# Patient Record
Sex: FEMALE | Race: WHITE | NOT HISPANIC OR LATINO | Employment: FULL TIME | URBAN - METROPOLITAN AREA
[De-identification: names, ages, dates, MRNs, and addresses within clinical notes are randomized per-mention and may not be internally consistent; named-entity substitution may affect disease eponyms.]

---
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
## 2021-08-02 ENCOUNTER — OFFICE VISIT (OUTPATIENT)
Dept: URGENT CARE | Facility: CLINIC | Age: 21
End: 2021-08-02
Payer: COMMERCIAL

## 2021-08-02 ENCOUNTER — HOSPITAL ENCOUNTER (OUTPATIENT)
Facility: MEDICAL CENTER | Age: 21
End: 2021-08-02
Attending: PHYSICIAN ASSISTANT
Payer: COMMERCIAL

## 2021-08-02 VITALS
DIASTOLIC BLOOD PRESSURE: 82 MMHG | HEART RATE: 81 BPM | OXYGEN SATURATION: 95 % | TEMPERATURE: 97.6 F | WEIGHT: 228.7 LBS | HEIGHT: 67 IN | SYSTOLIC BLOOD PRESSURE: 126 MMHG | BODY MASS INDEX: 35.9 KG/M2 | RESPIRATION RATE: 16 BRPM

## 2021-08-02 DIAGNOSIS — R19.5 LOOSE STOOLS: ICD-10-CM

## 2021-08-02 DIAGNOSIS — J02.9 SORE THROAT: ICD-10-CM

## 2021-08-02 DIAGNOSIS — R51.9 NONINTRACTABLE HEADACHE, UNSPECIFIED CHRONICITY PATTERN, UNSPECIFIED HEADACHE TYPE: ICD-10-CM

## 2021-08-02 PROBLEM — G43.909 MIGRAINE: Status: ACTIVE | Noted: 2020-10-22

## 2021-08-02 PROBLEM — D69.6 THROMBOCYTOPENIA (HCC): Status: ACTIVE | Noted: 2020-10-22

## 2021-08-02 PROCEDURE — 99203 OFFICE O/P NEW LOW 30 MIN: CPT | Performed by: PHYSICIAN ASSISTANT

## 2021-08-02 PROCEDURE — U0005 INFEC AGEN DETEC AMPLI PROBE: HCPCS

## 2021-08-02 PROCEDURE — U0003 INFECTIOUS AGENT DETECTION BY NUCLEIC ACID (DNA OR RNA); SEVERE ACUTE RESPIRATORY SYNDROME CORONAVIRUS 2 (SARS-COV-2) (CORONAVIRUS DISEASE [COVID-19]), AMPLIFIED PROBE TECHNIQUE, MAKING USE OF HIGH THROUGHPUT TECHNOLOGIES AS DESCRIBED BY CMS-2020-01-R: HCPCS

## 2021-08-02 ASSESSMENT — ENCOUNTER SYMPTOMS
MYALGIAS: 1
DIARRHEA: 0
WHEEZING: 0
SHORTNESS OF BREATH: 0
COUGH: 0
CHANGE IN BOWEL HABIT: 1
NAUSEA: 0
DIZZINESS: 0
SPUTUM PRODUCTION: 0
SORE THROAT: 1
HEADACHES: 1
FATIGUE: 1
CHILLS: 0
EYE REDNESS: 0
EYE DISCHARGE: 0
FEVER: 0
NECK PAIN: 0

## 2021-08-02 NOTE — PROGRESS NOTES
Subjective:      Norris Jefferson is a 21 y.o. female who presents with Coronavirus Screening (diarrhea, chills, sore throat, runny nose, headache started this morning)            Patient is a 21-year-old female who presents to urgent care with her significant other who also is being evaluated for similar symptoms today.  Patient awoke this morning with loose stools, body aches, scratchy throat, headache of which have waxed and waned throughout the morning.  Patient's partner is currently being tested for COVID-19 prompting patient have evaluation today as well as she is several fairly good bruise with close contact.  Patient has had a history of COVID-19 and is fully vaccinated.  No other ill exposures aside from family members.  Denies any cough, difficulty breathing, shortness of breath.  She did take some Tylenol to assist with headache of which she does report mild improvement    Other  This is a new problem. The current episode started today. The problem occurs constantly. The problem has been unchanged. Associated symptoms include a change in bowel habit, congestion, fatigue, headaches, myalgias and a sore throat. Pertinent negatives include no chest pain, chills, coughing, fever, nausea, neck pain or rash. Nothing aggravates the symptoms. She has tried acetaminophen for the symptoms. The treatment provided moderate relief.       Review of Systems   Constitutional: Positive for fatigue and malaise/fatigue. Negative for chills and fever.   HENT: Positive for congestion and sore throat. Negative for ear discharge and ear pain.    Eyes: Negative for discharge and redness.   Respiratory: Negative for cough, sputum production, shortness of breath and wheezing.    Cardiovascular: Negative for chest pain and leg swelling.   Gastrointestinal: Positive for change in bowel habit. Negative for diarrhea and nausea.   Genitourinary: Negative for dysuria and urgency.   Musculoskeletal: Positive for myalgias. Negative for  "neck pain.   Skin: Negative for itching and rash.   Neurological: Positive for headaches. Negative for dizziness.   All other systems reviewed and are negative.         Objective:     /82 (BP Location: Right arm, Patient Position: Sitting)   Pulse 81   Temp 36.4 °C (97.6 °F) (Temporal)   Resp 16   Ht 1.702 m (5' 7\")   Wt 104 kg (228 lb 11.2 oz)   SpO2 95%   BMI 35.82 kg/m²    PMH:  has a past medical history of Migraines.  MEDS: Reviewed .   ALLERGIES:   Allergies   Allergen Reactions   • Nkda [No Known Drug Allergy]      SURGHX: No past surgical history on file.  SOCHX:  reports that she has been smoking. She uses smokeless tobacco. She reports that she does not drink alcohol and does not use drugs.  FH: Family history was reviewed, no pertinent findings to report    Physical Exam  Vitals reviewed.   Constitutional:       General: She is not in acute distress.     Appearance: She is well-developed.   HENT:      Head: Normocephalic and atraumatic.      Right Ear: External ear normal.      Left Ear: External ear normal.      Mouth/Throat:      Pharynx: No oropharyngeal exudate.   Eyes:      Conjunctiva/sclera: Conjunctivae normal.      Pupils: Pupils are equal, round, and reactive to light.   Neck:      Trachea: No tracheal deviation.   Cardiovascular:      Rate and Rhythm: Normal rate and regular rhythm.      Heart sounds: No murmur heard.     Pulmonary:      Effort: Pulmonary effort is normal. No respiratory distress.      Breath sounds: Normal breath sounds.   Musculoskeletal:         General: Normal range of motion.      Cervical back: Normal range of motion and neck supple.   Skin:     General: Skin is warm.      Findings: No rash.   Neurological:      Mental Status: She is alert and oriented to person, place, and time.      Coordination: Coordination normal.   Psychiatric:         Behavior: Behavior normal.         Thought Content: Thought content normal.         Judgment: Judgment normal.          "               Assessment/Plan:        1. Nonintractable headache, unspecified chronicity pattern, unspecified headache type  2. Loose stools    Appropriate PPE worn at all times by provider.   Pt. Had face mask on throughout entirety of the visit other than oropharyngeal examination today.     Testing performed for COVID-19.    Patient currently without indication of need for higher level of care.    Results will also be released to patient/guardian in MyChart or called to the patient/guardian directly.  Encouraged mask use, frequent handwashing, wiping down hard surfaces, etc.    Patient and/or guardian given precautionary s/sx that mandate immediate follow up and evaluation in the ED. Advised of risks of not doing so.  Side effects of OTC or prescribed medications discussed.   DDX, Supportive care, and indications for immediate follow-up discussed with patient.    Instructed to return to clinic or nearest emergency department if we are not available for any change in condition, further concerns, or worsening of symptoms.    The patient and/or guardian demonstrated a good understanding and agreed with the treatment plan.    Please note that this dictation was created using voice recognition software. I have made every reasonable attempt to correct obvious errors, but I expect that there are errors of grammar and possibly content that I did not discover before finalizing the note.

## 2021-08-03 LAB
COVID ORDER STATUS COVID19: NORMAL
SARS-COV-2 RNA RESP QL NAA+PROBE: NOTDETECTED
SPECIMEN SOURCE: NORMAL

## 2022-04-11 ENCOUNTER — OFFICE VISIT (OUTPATIENT)
Dept: URGENT CARE | Facility: CLINIC | Age: 22
End: 2022-04-11
Payer: COMMERCIAL

## 2022-04-11 VITALS
RESPIRATION RATE: 16 BRPM | TEMPERATURE: 97.2 F | SYSTOLIC BLOOD PRESSURE: 114 MMHG | WEIGHT: 234 LBS | BODY MASS INDEX: 36.73 KG/M2 | HEIGHT: 67 IN | HEART RATE: 86 BPM | DIASTOLIC BLOOD PRESSURE: 72 MMHG | OXYGEN SATURATION: 98 %

## 2022-04-11 DIAGNOSIS — H10.33 ACUTE CONJUNCTIVITIS OF BOTH EYES, UNSPECIFIED ACUTE CONJUNCTIVITIS TYPE: ICD-10-CM

## 2022-04-11 PROCEDURE — 99213 OFFICE O/P EST LOW 20 MIN: CPT | Performed by: FAMILY MEDICINE

## 2022-04-11 RX ORDER — POLYMYXIN B SULFATE AND TRIMETHOPRIM 1; 10000 MG/ML; [USP'U]/ML
1 SOLUTION OPHTHALMIC 4 TIMES DAILY
Qty: 10 ML | Refills: 0 | Status: SHIPPED | OUTPATIENT
Start: 2022-04-11 | End: 2022-04-16

## 2022-04-11 ASSESSMENT — ENCOUNTER SYMPTOMS
MYALGIAS: 0
NAUSEA: 0
VOMITING: 0
PHOTOPHOBIA: 0
WEIGHT LOSS: 0
DOUBLE VISION: 0
BLURRED VISION: 0

## 2022-04-11 NOTE — LETTER
April 11, 2022         Patient: Norris Jefferson   YOB: 2000   Date of Visit: 4/11/2022           To Whom it May Concern:    Norris Jefferson was seen in my clinic on 4/11/2022. Please excuse from work today.      Sincerely,           Neville Olivares M.D.  Electronically Signed

## 2022-04-11 NOTE — PROGRESS NOTES
"Subjective     Norris Jefferson is a 21 y.o. female who presents with Conjunctivitis (Bilateral; guppy ; itchy x last night )            Onset night bilateral eye redness, irritation, and drainage.  Mattering this morning.  No contact lens use.  No obvious viral or allergic prodrome.  She does have itching.  No trauma or foreign body.  No over-the-counter medications.  No known exposures.  No other aggravating or alleviating factors.      Review of Systems   Constitutional: Negative for malaise/fatigue and weight loss.   Eyes: Negative for blurred vision, double vision and photophobia.   Gastrointestinal: Negative for nausea and vomiting.   Musculoskeletal: Negative for joint pain and myalgias.   Skin: Negative for itching and rash.              Objective     /72   Pulse 86   Temp 36.2 °C (97.2 °F) (Temporal)   Resp 16   Ht 1.702 m (5' 7\")   Wt 106 kg (234 lb)   SpO2 98%   BMI 36.65 kg/m²      Physical Exam  Constitutional:       Appearance: Normal appearance.   HENT:      Right Ear: Tympanic membrane normal.      Left Ear: Tympanic membrane normal.      Nose: Nose normal. No congestion.      Mouth/Throat:      Mouth: Mucous membranes are moist.   Eyes:      Extraocular Movements: Extraocular movements intact.      Pupils: Pupils are equal, round, and reactive to light.      Comments: B conjunctiva injected with moderate exudate. No foreign body. No gross corneal lesion.     Skin:     General: Skin is warm and dry.   Neurological:      Mental Status: She is alert.                             Assessment & Plan        1. Acute conjunctivitis of both eyes, unspecified acute conjunctivitis type    - polymixin-trimethoprim (POLYTRIM) 82787-0.1 UNIT/ML-% Solution; Administer 1 Drop into both eyes 4 times a day for 5 days.  Dispense: 10 mL; Refill: 0    Differential diagnosis, natural history, supportive care, and indications for immediate follow-up discussed at length.                 "

## 2024-05-05 ENCOUNTER — OFFICE VISIT (OUTPATIENT)
Dept: URGENT CARE | Facility: CLINIC | Age: 24
End: 2024-05-05
Payer: COMMERCIAL

## 2024-05-05 VITALS
HEIGHT: 67 IN | OXYGEN SATURATION: 97 % | TEMPERATURE: 97.9 F | SYSTOLIC BLOOD PRESSURE: 122 MMHG | RESPIRATION RATE: 16 BRPM | BODY MASS INDEX: 39.24 KG/M2 | DIASTOLIC BLOOD PRESSURE: 78 MMHG | HEART RATE: 98 BPM | WEIGHT: 250 LBS

## 2024-05-05 DIAGNOSIS — J01.00 ACUTE MAXILLARY SINUSITIS, RECURRENCE NOT SPECIFIED: Primary | ICD-10-CM

## 2024-05-05 PROCEDURE — 3074F SYST BP LT 130 MM HG: CPT | Performed by: PHYSICIAN ASSISTANT

## 2024-05-05 PROCEDURE — 99213 OFFICE O/P EST LOW 20 MIN: CPT | Performed by: PHYSICIAN ASSISTANT

## 2024-05-05 PROCEDURE — 3078F DIAST BP <80 MM HG: CPT | Performed by: PHYSICIAN ASSISTANT

## 2024-05-05 RX ORDER — AMOXICILLIN AND CLAVULANATE POTASSIUM 875; 125 MG/1; MG/1
1 TABLET, FILM COATED ORAL 2 TIMES DAILY
Qty: 14 TABLET | Refills: 0 | Status: SHIPPED | OUTPATIENT
Start: 2024-05-05 | End: 2024-05-12

## 2024-05-05 RX ORDER — DEXTROMETHORPHAN HYDROBROMIDE AND PROMETHAZINE HYDROCHLORIDE 15; 6.25 MG/5ML; MG/5ML
5 SYRUP ORAL 4 TIMES DAILY PRN
Qty: 120 ML | Refills: 0 | Status: SHIPPED | OUTPATIENT
Start: 2024-05-05 | End: 2024-05-11

## 2024-05-05 ASSESSMENT — ENCOUNTER SYMPTOMS
SINUS PAIN: 1
SWOLLEN GLANDS: 0
HEADACHES: 1
MYALGIAS: 0
COUGH: 1
SHORTNESS OF BREATH: 0
NAUSEA: 0
SINUS PRESSURE: 1
CHILLS: 0
SORE THROAT: 0
WHEEZING: 0
VOMITING: 0
FEVER: 0
DIARRHEA: 0

## 2024-05-05 ASSESSMENT — FIBROSIS 4 INDEX: FIB4 SCORE: 0.38

## 2024-05-05 NOTE — PROGRESS NOTES
"Graeme Jefferson is a 23 y.o. female who presents with Cough (Headaches x 2 weeks , congestion x 3 days )            Sinus Problem  This is a new problem. The current episode started in the past 7 days. The problem has been gradually worsening since onset. Maximum temperature: subjective fever. The pain is moderate. Associated symptoms include congestion (green and blood-tinged mucous), coughing, headaches and sinus pressure. Pertinent negatives include no chills, ear pain, shortness of breath, sneezing, sore throat or swollen glands. Treatments tried: OTC Dayquil and NYquil. The treatment provided mild relief.       Past Medical History:   Diagnosis Date    Migraines        No past surgical history on file.    No family history on file.    Nkda [no known drug allergy]    Medications, Allergies, and current problem list reviewed today in Epic      Review of Systems   Constitutional:  Negative for chills, fever and malaise/fatigue.   HENT:  Positive for congestion (green and blood-tinged mucous), sinus pressure and sinus pain. Negative for ear pain, sneezing and sore throat.    Respiratory:  Positive for cough. Negative for shortness of breath and wheezing.    Cardiovascular:  Positive for chest pain. Negative for leg swelling.   Gastrointestinal:  Negative for diarrhea, nausea and vomiting.   Musculoskeletal:  Negative for myalgias.   Skin:  Negative for rash.   Neurological:  Positive for headaches.     All other systems reviewed and are negative.            Objective     /78   Pulse 98   Temp 36.6 °C (97.9 °F) (Temporal)   Resp 16   Ht 1.702 m (5' 7\")   Wt 113 kg (250 lb)   SpO2 97%   BMI 39.16 kg/m²      Physical Exam  Constitutional:       General: She is not in acute distress.     Appearance: She is not ill-appearing.   HENT:      Head: Normocephalic and atraumatic.      Right Ear: Tympanic membrane, ear canal and external ear normal.      Left Ear: Tympanic membrane, ear canal and " external ear normal.      Nose: Mucosal edema, congestion and rhinorrhea present.      Right Sinus: Maxillary sinus tenderness present.      Left Sinus: Maxillary sinus tenderness present.      Mouth/Throat:      Mouth: Mucous membranes are moist.      Pharynx: No posterior oropharyngeal erythema.   Eyes:      Conjunctiva/sclera: Conjunctivae normal.   Cardiovascular:      Rate and Rhythm: Normal rate and regular rhythm.      Heart sounds: Normal heart sounds. No murmur heard.  Pulmonary:      Effort: Pulmonary effort is normal. No respiratory distress.      Breath sounds: Normal breath sounds. No wheezing, rhonchi or rales.   Lymphadenopathy:      Cervical: No cervical adenopathy.   Skin:     General: Skin is warm and dry.      Findings: No rash.   Neurological:      General: No focal deficit present.      Mental Status: She is alert and oriented to person, place, and time.   Psychiatric:         Mood and Affect: Mood normal.         Behavior: Behavior normal.         Thought Content: Thought content normal.         Judgment: Judgment normal.                             Assessment & Plan        1. Acute maxillary sinusitis, recurrence not specified    - amoxicillin-clavulanate (AUGMENTIN) 875-125 MG Tab; Take 1 Tablet by mouth 2 times a day for 7 days.  Dispense: 14 Tablet; Refill: 0  - promethazine-dextromethorphan (PROMETHAZINE-DM) 6.25-15 MG/5ML syrup; Take 5 mL by mouth 4 times a day as needed for Cough for up to 6 days.  Dispense: 120 mL; Refill: 0      Differential diagnoses, Supportive care, and indications for immediate follow-up discussed with patient.   Pathogenesis of diagnosis discussed including typical length and natural progression.   Instructed to return to clinic or nearest emergency department for any change in condition, further concerns, or worsening of symptoms.    The patient demonstrated a good understanding and agreed with the treatment plan.    Chyna Ellington P.A.-C.

## 2024-06-09 ENCOUNTER — OFFICE VISIT (OUTPATIENT)
Dept: URGENT CARE | Facility: CLINIC | Age: 24
End: 2024-06-09
Payer: COMMERCIAL

## 2024-06-09 VITALS
DIASTOLIC BLOOD PRESSURE: 60 MMHG | WEIGHT: 256 LBS | SYSTOLIC BLOOD PRESSURE: 124 MMHG | BODY MASS INDEX: 40.18 KG/M2 | TEMPERATURE: 97.8 F | OXYGEN SATURATION: 97 % | HEIGHT: 67 IN | HEART RATE: 84 BPM | RESPIRATION RATE: 18 BRPM

## 2024-06-09 DIAGNOSIS — B96.89 ACUTE BACTERIAL PHARYNGITIS: ICD-10-CM

## 2024-06-09 DIAGNOSIS — J02.8 ACUTE BACTERIAL PHARYNGITIS: ICD-10-CM

## 2024-06-09 DIAGNOSIS — J02.9 SORE THROAT: ICD-10-CM

## 2024-06-09 PROCEDURE — 3074F SYST BP LT 130 MM HG: CPT | Performed by: NURSE PRACTITIONER

## 2024-06-09 PROCEDURE — 99214 OFFICE O/P EST MOD 30 MIN: CPT | Performed by: NURSE PRACTITIONER

## 2024-06-09 PROCEDURE — 3078F DIAST BP <80 MM HG: CPT | Performed by: NURSE PRACTITIONER

## 2024-06-09 RX ORDER — AMOXICILLIN 875 MG/1
875 TABLET, COATED ORAL 2 TIMES DAILY
Qty: 14 TABLET | Refills: 0 | Status: SHIPPED | OUTPATIENT
Start: 2024-06-09 | End: 2024-06-16

## 2024-06-09 ASSESSMENT — FIBROSIS 4 INDEX: FIB4 SCORE: 0.4

## 2024-06-09 NOTE — PROGRESS NOTES
"Subjective:   Norris Jefferson is a 24 y.o. female who presents for Sore Throat (X 2 -3 weeks)       HPI  Patient is a pleasant 24 y.o. who presents for evaluation of 2 to 3-week history of sore throat, pain with swallowing, and decreased appetite secondary to sore throat.  Has tried over-the-counter cough and cold medication and cough drops without noticing any relief.    ROS  All other systems are negative except as documented above within HPI.    MEDS: No current outpatient medications on file.  ALLERGIES:   Allergies   Allergen Reactions    Nkda [No Known Drug Allergy]        Patient's PMH, SocHx, SurgHx, FamHx, Drug allergies and medications were reviewed.     Objective:   /60   Pulse 84   Temp 36.6 °C (97.8 °F) (Temporal)   Resp 18   Ht 1.702 m (5' 7\")   Wt 116 kg (256 lb)   SpO2 97%   BMI 40.10 kg/m²     Physical Exam  Vitals and nursing note reviewed.   Constitutional:       General: She is awake.      Appearance: Normal appearance. She is well-developed and normal weight.   HENT:      Head: Normocephalic and atraumatic.      Right Ear: Tympanic membrane, ear canal and external ear normal.      Left Ear: Tympanic membrane, ear canal and external ear normal.      Nose: No nasal tenderness, mucosal edema, congestion or rhinorrhea.      Right Sinus: No frontal sinus tenderness.      Left Sinus: No frontal sinus tenderness.      Mouth/Throat:      Lips: Pink.      Mouth: Mucous membranes are moist.      Pharynx: Oropharynx is clear. Uvula midline. Posterior oropharyngeal erythema present.      Tonsils: 3+ on the right. 3+ on the left.   Eyes:      Extraocular Movements: Extraocular movements intact.      Conjunctiva/sclera: Conjunctivae normal.   Cardiovascular:      Rate and Rhythm: Normal rate and regular rhythm.      Pulses: Normal pulses.      Heart sounds: Normal heart sounds.   Pulmonary:      Effort: Pulmonary effort is normal.      Breath sounds: Normal breath sounds.   Musculoskeletal:    "      General: Normal range of motion.      Cervical back: Normal range of motion and neck supple.   Lymphadenopathy:      Head:      Right side of head: Tonsillar adenopathy present.      Left side of head: Tonsillar adenopathy present.   Skin:     General: Skin is warm and dry.   Neurological:      General: No focal deficit present.      Mental Status: She is alert and oriented to person, place, and time.   Psychiatric:         Mood and Affect: Mood normal.         Behavior: Behavior normal. Behavior is cooperative.         Thought Content: Thought content normal.         Judgment: Judgment normal.         Assessment/Plan:   Assessment    1. Acute bacterial pharyngitis  - amoxicillin (AMOXIL) 875 MG tablet; Take 1 Tablet by mouth 2 times a day for 7 days.  Dispense: 14 Tablet; Refill: 0    2. Sore throat      Vital signs stable at today's acute urgent care visit.  Begin medications as listed. Recommend Listerine/ salt water gargle.    Advised the patient to follow-up with the primary care provider if symptoms persist.  Red flags discussed and indications to immediately call 911 or present to the ED. All questions were encouraged and answered to the patient's satisfaction and understanding, and they agree to the plan of care.     This is an acute problem with uncertain prognosis, medication management and instructions as well as management options were provided.  I personally reviewed prior external notes and test results pertinent to today and independently reviewed and interpreted all diagnostics, to include POCT testing. Time spent evaluating this patient includes preparing for visit, counseling/education, exam, evaluation, obtaining history, and ordering lab/test/procedures.      Please note that this dictation was created using voice recognition software. I have made a reasonable attempt to correct obvious errors, but I expect that there are errors of grammar and possibly content that I did not discover before  finalizing the note.

## 2024-06-20 ENCOUNTER — OFFICE VISIT (OUTPATIENT)
Dept: MEDICAL GROUP | Facility: PHYSICIAN GROUP | Age: 24
End: 2024-06-20
Payer: COMMERCIAL

## 2024-06-20 VITALS
DIASTOLIC BLOOD PRESSURE: 78 MMHG | OXYGEN SATURATION: 98 % | SYSTOLIC BLOOD PRESSURE: 118 MMHG | BODY MASS INDEX: 39.71 KG/M2 | WEIGHT: 253 LBS | TEMPERATURE: 97.3 F | RESPIRATION RATE: 12 BRPM | HEART RATE: 97 BPM | HEIGHT: 67 IN

## 2024-06-20 DIAGNOSIS — Z11.3 SCREEN FOR STD (SEXUALLY TRANSMITTED DISEASE): ICD-10-CM

## 2024-06-20 DIAGNOSIS — G56.03 BILATERAL CARPAL TUNNEL SYNDROME: ICD-10-CM

## 2024-06-20 DIAGNOSIS — Z11.59 ENCOUNTER FOR HEPATITIS C SCREENING TEST FOR LOW RISK PATIENT: ICD-10-CM

## 2024-06-20 DIAGNOSIS — Z00.00 PHYSICAL EXAM, ANNUAL: ICD-10-CM

## 2024-06-20 DIAGNOSIS — Z72.0 CURRENT EVERY DAY NICOTINE VAPING: ICD-10-CM

## 2024-06-20 DIAGNOSIS — G43.109 MIGRAINE WITH AURA AND WITHOUT STATUS MIGRAINOSUS, NOT INTRACTABLE: ICD-10-CM

## 2024-06-20 PROCEDURE — 99213 OFFICE O/P EST LOW 20 MIN: CPT | Performed by: PHYSICIAN ASSISTANT

## 2024-06-20 PROCEDURE — 3078F DIAST BP <80 MM HG: CPT | Performed by: PHYSICIAN ASSISTANT

## 2024-06-20 PROCEDURE — 3074F SYST BP LT 130 MM HG: CPT | Performed by: PHYSICIAN ASSISTANT

## 2024-06-20 SDOH — ECONOMIC STABILITY: HOUSING INSECURITY: IN THE LAST 12 MONTHS, HOW MANY PLACES HAVE YOU LIVED?: 3

## 2024-06-20 SDOH — ECONOMIC STABILITY: FOOD INSECURITY: WITHIN THE PAST 12 MONTHS, YOU WORRIED THAT YOUR FOOD WOULD RUN OUT BEFORE YOU GOT MONEY TO BUY MORE.: NEVER TRUE

## 2024-06-20 SDOH — ECONOMIC STABILITY: FOOD INSECURITY: WITHIN THE PAST 12 MONTHS, THE FOOD YOU BOUGHT JUST DIDN'T LAST AND YOU DIDN'T HAVE MONEY TO GET MORE.: NEVER TRUE

## 2024-06-20 SDOH — HEALTH STABILITY: PHYSICAL HEALTH: ON AVERAGE, HOW MANY DAYS PER WEEK DO YOU ENGAGE IN MODERATE TO STRENUOUS EXERCISE (LIKE A BRISK WALK)?: 3 DAYS

## 2024-06-20 SDOH — ECONOMIC STABILITY: HOUSING INSECURITY
IN THE LAST 12 MONTHS, WAS THERE A TIME WHEN YOU DID NOT HAVE A STEADY PLACE TO SLEEP OR SLEPT IN A SHELTER (INCLUDING NOW)?: NO

## 2024-06-20 SDOH — HEALTH STABILITY: MENTAL HEALTH
STRESS IS WHEN SOMEONE FEELS TENSE, NERVOUS, ANXIOUS, OR CAN'T SLEEP AT NIGHT BECAUSE THEIR MIND IS TROUBLED. HOW STRESSED ARE YOU?: RATHER MUCH

## 2024-06-20 SDOH — ECONOMIC STABILITY: INCOME INSECURITY: HOW HARD IS IT FOR YOU TO PAY FOR THE VERY BASICS LIKE FOOD, HOUSING, MEDICAL CARE, AND HEATING?: NOT VERY HARD

## 2024-06-20 SDOH — ECONOMIC STABILITY: TRANSPORTATION INSECURITY
IN THE PAST 12 MONTHS, HAS THE LACK OF TRANSPORTATION KEPT YOU FROM MEDICAL APPOINTMENTS OR FROM GETTING MEDICATIONS?: NO

## 2024-06-20 SDOH — ECONOMIC STABILITY: INCOME INSECURITY: IN THE LAST 12 MONTHS, WAS THERE A TIME WHEN YOU WERE NOT ABLE TO PAY THE MORTGAGE OR RENT ON TIME?: NO

## 2024-06-20 SDOH — ECONOMIC STABILITY: TRANSPORTATION INSECURITY
IN THE PAST 12 MONTHS, HAS LACK OF RELIABLE TRANSPORTATION KEPT YOU FROM MEDICAL APPOINTMENTS, MEETINGS, WORK OR FROM GETTING THINGS NEEDED FOR DAILY LIVING?: NO

## 2024-06-20 SDOH — ECONOMIC STABILITY: TRANSPORTATION INSECURITY
IN THE PAST 12 MONTHS, HAS LACK OF TRANSPORTATION KEPT YOU FROM MEETINGS, WORK, OR FROM GETTING THINGS NEEDED FOR DAILY LIVING?: NO

## 2024-06-20 SDOH — HEALTH STABILITY: PHYSICAL HEALTH: ON AVERAGE, HOW MANY MINUTES DO YOU ENGAGE IN EXERCISE AT THIS LEVEL?: 20 MIN

## 2024-06-20 ASSESSMENT — SOCIAL DETERMINANTS OF HEALTH (SDOH)
DO YOU BELONG TO ANY CLUBS OR ORGANIZATIONS SUCH AS CHURCH GROUPS UNIONS, FRATERNAL OR ATHLETIC GROUPS, OR SCHOOL GROUPS?: NO
HOW OFTEN DO YOU HAVE SIX OR MORE DRINKS ON ONE OCCASION: LESS THAN MONTHLY
WITHIN THE PAST 12 MONTHS, YOU WORRIED THAT YOUR FOOD WOULD RUN OUT BEFORE YOU GOT THE MONEY TO BUY MORE: NEVER TRUE
HOW OFTEN DO YOU ATTENT MEETINGS OF THE CLUB OR ORGANIZATION YOU BELONG TO?: PATIENT DECLINED
IN A TYPICAL WEEK, HOW MANY TIMES DO YOU TALK ON THE PHONE WITH FAMILY, FRIENDS, OR NEIGHBORS?: THREE TIMES A WEEK
HOW OFTEN DO YOU GET TOGETHER WITH FRIENDS OR RELATIVES?: NEVER
HOW OFTEN DO YOU GET TOGETHER WITH FRIENDS OR RELATIVES?: NEVER
IN THE PAST 12 MONTHS, HAS THE ELECTRIC, GAS, OIL, OR WATER COMPANY THREATENED TO SHUT OFF SERVICE IN YOUR HOME?: NO
ARE YOU MARRIED, WIDOWED, DIVORCED, SEPARATED, NEVER MARRIED, OR LIVING WITH A PARTNER?: LIVING WITH PARTNER
HOW OFTEN DO YOU HAVE A DRINK CONTAINING ALCOHOL: 2-4 TIMES A MONTH
DO YOU BELONG TO ANY CLUBS OR ORGANIZATIONS SUCH AS CHURCH GROUPS UNIONS, FRATERNAL OR ATHLETIC GROUPS, OR SCHOOL GROUPS?: NO
HOW OFTEN DO YOU ATTEND CHURCH OR RELIGIOUS SERVICES?: NEVER
IN A TYPICAL WEEK, HOW MANY TIMES DO YOU TALK ON THE PHONE WITH FAMILY, FRIENDS, OR NEIGHBORS?: THREE TIMES A WEEK
ARE YOU MARRIED, WIDOWED, DIVORCED, SEPARATED, NEVER MARRIED, OR LIVING WITH A PARTNER?: LIVING WITH PARTNER
HOW OFTEN DO YOU ATTEND CHURCH OR RELIGIOUS SERVICES?: NEVER
HOW MANY DRINKS CONTAINING ALCOHOL DO YOU HAVE ON A TYPICAL DAY WHEN YOU ARE DRINKING: 1 OR 2
HOW OFTEN DO YOU ATTENT MEETINGS OF THE CLUB OR ORGANIZATION YOU BELONG TO?: PATIENT DECLINED
HOW HARD IS IT FOR YOU TO PAY FOR THE VERY BASICS LIKE FOOD, HOUSING, MEDICAL CARE, AND HEATING?: NOT VERY HARD

## 2024-06-20 ASSESSMENT — PATIENT HEALTH QUESTIONNAIRE - PHQ9
SUM OF ALL RESPONSES TO PHQ QUESTIONS 1-9: 9
CLINICAL INTERPRETATION OF PHQ2 SCORE: 2
5. POOR APPETITE OR OVEREATING: 2 - MORE THAN HALF THE DAYS

## 2024-06-20 ASSESSMENT — FIBROSIS 4 INDEX: FIB4 SCORE: 0.4

## 2024-06-20 ASSESSMENT — LIFESTYLE VARIABLES
HOW MANY STANDARD DRINKS CONTAINING ALCOHOL DO YOU HAVE ON A TYPICAL DAY: 1 OR 2
HOW OFTEN DO YOU HAVE A DRINK CONTAINING ALCOHOL: 2-4 TIMES A MONTH
SKIP TO QUESTIONS 9-10: 0
AUDIT-C TOTAL SCORE: 3
HOW OFTEN DO YOU HAVE SIX OR MORE DRINKS ON ONE OCCASION: LESS THAN MONTHLY

## 2024-06-20 NOTE — PROGRESS NOTES
CC:    Chief Complaint   Patient presents with    Establish Care       HISTORY OF THE PRESENT ILLNESS: Patient is a 24 y.o. female presenting to establish primary care     Pt having bilateral carpal tunnel pain. Has been having this since her first pregnancy in 2020. Sxs worse at night. She is currently in nursing school.   Pt gets migraines that mimick stroke sxs. Has had them since she was a child. Used to take maxalt. Just takes OTC meds now though.   Pt has not had PAP smear in years    No problem-specific Assessment & Plan notes found for this encounter.    Allergies: Nkda [no known drug allergy]    No current [facility]-ordered outpatient medications on file.     No current [facility]-ordered facility-administered medications on file.       Past Medical History:   Diagnosis Date    Migraines        No past surgical history on file.    Social History     Tobacco Use    Smoking status: Never    Smokeless tobacco: Never    Tobacco comments:     vaping   Vaping Use    Vaping status: Every Day    Substances: Nicotine    Devices: Disposable   Substance Use Topics    Alcohol use: Yes     Comment: occ    Drug use: No       Social History     Social History Narrative    Not on file       No family history on file.    ROS:     - Constitutional: Negative for fever, chills, unexpected weight change, and fatigue/generalized weakness.     - HEENT: Negative for headaches, vision changes, hearing changes, ear pain, ear discharge, rhinorrhea, sinus congestion, sore throat, and neck pain.      - Respiratory: Negative for cough, sputum production, chest congestion, dyspnea, wheezing, and crackles.      - Cardiovascular: Negative for chest pain, palpitations, orthopnea, and bilateral lower extremity edema.     - Gastrointestinal: Positive for constipation. Negative for heartburn, nausea, vomiting, abdominal pain, hematochezia, melena, diarrhea, and greasy/foul-smelling stools.     - Genitourinary: Negative for dysuria, polyuria, hematuria,  "pyuria, urinary urgency, and urinary incontinence.     - Musculoskeletal: Negative for myalgias, back pain, and joint pain.     - Skin: Negative for rash, itching, cyanotic skin color change.     - Neurological:Positive for migraines.  Negative for dizziness, tingling, tremors, focal sensory deficit, focal weakness     - Endo/Heme/Allergies: Does not bruise/bleed easily.     - Psychiatric/Behavioral: Positive for anxiety and depression. Negative for suicidal/homicidal ideation and memory loss.            .      Exam: /78   Pulse 97   Temp 36.3 °C (97.3 °F) (Temporal)   Resp 12   Ht 1.702 m (5' 7\")   Wt 115 kg (253 lb)   SpO2 98%  Body mass index is 39.63 kg/m².    General: Normal appearing. No acute distress.  Skin: Warm and dry.  No obvious lesions.  HEENT: Normocephalic. Eyes conjunctiva clear lids without ptosis, ears normal shape and contour  Cardiovascular: Regular rate and rhythm without murmur.   Respiratory: Clear to auscultation bilaterally, no rhonchi wheezing or rales.  Neurologic: Grossly nonfocal, A&O x3, gait normal,  Musculoskeletal: No deformity or swelling.   Extremities: No extremity cyanosis, clubbing, or edema.  Psych: Normal mood and affect. Judgment and insight is normal.    Please note that this dictation was created using voice recognition software. I have made every reasonable attempt to correct obvious errors, but I expect that there are errors of grammar and possibly content that I did not discover before finalizing the note.      Assessment/Plan  1. Bilateral carpal tunnel syndrome  Referral to hand surgeon placed  - Referral to Hand Surgery    2. Physical exam, annual  Labs printed. Will do PAP at next visit  - CBC WITH DIFFERENTIAL; Future  - Comp Metabolic Panel; Future  - HEMOGLOBIN A1C; Future  - Lipid Profile; Future  - TSH WITH REFLEX TO FT4; Future    3. Encounter for hepatitis C screening test for low risk patient    - HEP C VIRUS ANTIBODY; Future    4. Screen for STD " (sexually transmitted disease)    - HIV AG/AB COMBO ASSAY SCREENING; Future    5. Migraine with aura and without status migrainosus, not intractable      6. Current every day nicotine vaping  Advised to quit

## 2024-07-22 ENCOUNTER — HOSPITAL ENCOUNTER (OUTPATIENT)
Facility: MEDICAL CENTER | Age: 24
End: 2024-07-22
Attending: PHYSICIAN ASSISTANT
Payer: COMMERCIAL

## 2024-07-22 ENCOUNTER — OFFICE VISIT (OUTPATIENT)
Dept: MEDICAL GROUP | Facility: PHYSICIAN GROUP | Age: 24
End: 2024-07-22
Payer: COMMERCIAL

## 2024-07-22 VITALS
WEIGHT: 256 LBS | HEART RATE: 99 BPM | TEMPERATURE: 97 F | RESPIRATION RATE: 12 BRPM | HEIGHT: 67 IN | BODY MASS INDEX: 40.18 KG/M2 | OXYGEN SATURATION: 96 % | SYSTOLIC BLOOD PRESSURE: 110 MMHG | DIASTOLIC BLOOD PRESSURE: 80 MMHG

## 2024-07-22 DIAGNOSIS — Z01.419 WELL WOMAN EXAM: ICD-10-CM

## 2024-07-22 DIAGNOSIS — N92.6 IRREGULAR MENSES: ICD-10-CM

## 2024-07-22 DIAGNOSIS — Z12.4 CERVICAL CANCER SCREENING: ICD-10-CM

## 2024-07-22 PROCEDURE — 3074F SYST BP LT 130 MM HG: CPT | Performed by: PHYSICIAN ASSISTANT

## 2024-07-22 PROCEDURE — 99395 PREV VISIT EST AGE 18-39: CPT | Performed by: PHYSICIAN ASSISTANT

## 2024-07-22 PROCEDURE — 3079F DIAST BP 80-89 MM HG: CPT | Performed by: PHYSICIAN ASSISTANT

## 2024-07-22 PROCEDURE — 87624 HPV HI-RISK TYP POOLED RSLT: CPT

## 2024-07-22 PROCEDURE — 88175 CYTOPATH C/V AUTO FLUID REDO: CPT

## 2024-07-22 ASSESSMENT — FIBROSIS 4 INDEX: FIB4 SCORE: 0.4

## 2024-07-23 DIAGNOSIS — Z12.4 CERVICAL CANCER SCREENING: ICD-10-CM

## 2024-07-26 LAB
CYTOLOGIST CVX/VAG CYTO: NORMAL
CYTOLOGY CVX/VAG DOC CYTO: NORMAL
CYTOLOGY CVX/VAG DOC THIN PREP: NORMAL
HPV I/H RISK 4 DNA CVX QL PROBE+SIG AMP: NEGATIVE
NOTE NL11727A: NORMAL
OTHER STN SPEC: NORMAL
STAT OF ADQ CVX/VAG CYTO-IMP: NORMAL

## 2024-09-29 ENCOUNTER — HOSPITAL ENCOUNTER (EMERGENCY)
Facility: MEDICAL CENTER | Age: 24
End: 2024-09-29
Attending: EMERGENCY MEDICINE
Payer: MEDICAID

## 2024-09-29 VITALS
HEIGHT: 67 IN | OXYGEN SATURATION: 93 % | DIASTOLIC BLOOD PRESSURE: 82 MMHG | WEIGHT: 274.03 LBS | TEMPERATURE: 97.4 F | RESPIRATION RATE: 18 BRPM | BODY MASS INDEX: 43.01 KG/M2 | HEART RATE: 85 BPM | SYSTOLIC BLOOD PRESSURE: 148 MMHG

## 2024-09-29 DIAGNOSIS — K04.7 INFECTED DENTAL CARIES: ICD-10-CM

## 2024-09-29 DIAGNOSIS — R10.13 EPIGASTRIC ABDOMINAL PAIN: ICD-10-CM

## 2024-09-29 DIAGNOSIS — K08.89 PAIN, DENTAL: ICD-10-CM

## 2024-09-29 DIAGNOSIS — K02.9 INFECTED DENTAL CARIES: ICD-10-CM

## 2024-09-29 LAB
ALBUMIN SERPL BCP-MCNC: 4.2 G/DL (ref 3.2–4.9)
ALBUMIN/GLOB SERPL: 1.4 G/DL
ALP SERPL-CCNC: 78 U/L (ref 30–99)
ALT SERPL-CCNC: 43 U/L (ref 2–50)
ANION GAP SERPL CALC-SCNC: 13 MMOL/L (ref 7–16)
APAP SERPL-MCNC: 19 UG/ML (ref 10–30)
APTT PPP: 28.4 SEC (ref 24.7–36)
AST SERPL-CCNC: 29 U/L (ref 12–45)
BASOPHILS # BLD AUTO: 1 % (ref 0–1.8)
BASOPHILS # BLD: 0.07 K/UL (ref 0–0.12)
BILIRUB SERPL-MCNC: 0.2 MG/DL (ref 0.1–1.5)
BUN SERPL-MCNC: 15 MG/DL (ref 8–22)
CALCIUM ALBUM COR SERPL-MCNC: 9.1 MG/DL (ref 8.5–10.5)
CALCIUM SERPL-MCNC: 9.3 MG/DL (ref 8.4–10.2)
CHLORIDE SERPL-SCNC: 104 MMOL/L (ref 96–112)
CO2 SERPL-SCNC: 21 MMOL/L (ref 20–33)
CREAT SERPL-MCNC: 0.85 MG/DL (ref 0.5–1.4)
EOSINOPHIL # BLD AUTO: 0.23 K/UL (ref 0–0.51)
EOSINOPHIL NFR BLD: 3.3 % (ref 0–6.9)
ERYTHROCYTE [DISTWIDTH] IN BLOOD BY AUTOMATED COUNT: 42.5 FL (ref 35.9–50)
GFR SERPLBLD CREATININE-BSD FMLA CKD-EPI: 97 ML/MIN/1.73 M 2
GLOBULIN SER CALC-MCNC: 2.9 G/DL (ref 1.9–3.5)
GLUCOSE SERPL-MCNC: 109 MG/DL (ref 65–99)
HCG SERPL QL: NEGATIVE
HCT VFR BLD AUTO: 39.9 % (ref 37–47)
HGB BLD-MCNC: 13.1 G/DL (ref 12–16)
IMM GRANULOCYTES # BLD AUTO: 0.01 K/UL (ref 0–0.11)
IMM GRANULOCYTES NFR BLD AUTO: 0.1 % (ref 0–0.9)
INR PPP: 0.93 (ref 0.87–1.13)
LYMPHOCYTES # BLD AUTO: 2.7 K/UL (ref 1–4.8)
LYMPHOCYTES NFR BLD: 38.6 % (ref 22–41)
MCH RBC QN AUTO: 26.6 PG (ref 27–33)
MCHC RBC AUTO-ENTMCNC: 32.8 G/DL (ref 32.2–35.5)
MCV RBC AUTO: 80.9 FL (ref 81.4–97.8)
MONOCYTES # BLD AUTO: 0.51 K/UL (ref 0–0.85)
MONOCYTES NFR BLD AUTO: 7.3 % (ref 0–13.4)
NEUTROPHILS # BLD AUTO: 3.47 K/UL (ref 1.82–7.42)
NEUTROPHILS NFR BLD: 49.7 % (ref 44–72)
NRBC # BLD AUTO: 0 K/UL
NRBC BLD-RTO: 0 /100 WBC (ref 0–0.2)
PLATELET # BLD AUTO: 260 K/UL (ref 164–446)
PMV BLD AUTO: 10 FL (ref 9–12.9)
POTASSIUM SERPL-SCNC: 4.1 MMOL/L (ref 3.6–5.5)
PROT SERPL-MCNC: 7.1 G/DL (ref 6–8.2)
PROTHROMBIN TIME: 12.7 SEC (ref 12–14.6)
RBC # BLD AUTO: 4.93 M/UL (ref 4.2–5.4)
SALICYLATES SERPL-MCNC: <1 MG/DL (ref 15–25)
SODIUM SERPL-SCNC: 138 MMOL/L (ref 135–145)
WBC # BLD AUTO: 7 K/UL (ref 4.8–10.8)

## 2024-09-29 PROCEDURE — 96374 THER/PROPH/DIAG INJ IV PUSH: CPT

## 2024-09-29 PROCEDURE — 99285 EMERGENCY DEPT VISIT HI MDM: CPT

## 2024-09-29 PROCEDURE — 80053 COMPREHEN METABOLIC PANEL: CPT

## 2024-09-29 PROCEDURE — 80143 DRUG ASSAY ACETAMINOPHEN: CPT

## 2024-09-29 PROCEDURE — A9270 NON-COVERED ITEM OR SERVICE: HCPCS | Performed by: EMERGENCY MEDICINE

## 2024-09-29 PROCEDURE — 700111 HCHG RX REV CODE 636 W/ 250 OVERRIDE (IP): Mod: JZ | Performed by: EMERGENCY MEDICINE

## 2024-09-29 PROCEDURE — 85730 THROMBOPLASTIN TIME PARTIAL: CPT

## 2024-09-29 PROCEDURE — 36415 COLL VENOUS BLD VENIPUNCTURE: CPT

## 2024-09-29 PROCEDURE — 700102 HCHG RX REV CODE 250 W/ 637 OVERRIDE(OP): Performed by: EMERGENCY MEDICINE

## 2024-09-29 PROCEDURE — 85610 PROTHROMBIN TIME: CPT

## 2024-09-29 PROCEDURE — 80179 DRUG ASSAY SALICYLATE: CPT

## 2024-09-29 PROCEDURE — 85025 COMPLETE CBC W/AUTO DIFF WBC: CPT

## 2024-09-29 PROCEDURE — 84703 CHORIONIC GONADOTROPIN ASSAY: CPT

## 2024-09-29 RX ORDER — MORPHINE SULFATE 15 MG/1
7.5 TABLET ORAL EVERY 6 HOURS PRN
Qty: 12 TABLET | Refills: 0 | Status: SHIPPED | OUTPATIENT
Start: 2024-09-29 | End: 2024-10-05

## 2024-09-29 RX ORDER — KETOROLAC TROMETHAMINE 15 MG/ML
15 INJECTION, SOLUTION INTRAMUSCULAR; INTRAVENOUS ONCE
Status: COMPLETED | OUTPATIENT
Start: 2024-09-29 | End: 2024-09-29

## 2024-09-29 RX ADMIN — LIDOCAINE HYDROCHLORIDE 30 ML: 20 SOLUTION ORAL; TOPICAL at 22:20

## 2024-09-29 RX ADMIN — OMEPRAZOLE 20 MG: 20 CAPSULE, DELAYED RELEASE ORAL at 22:20

## 2024-09-29 RX ADMIN — AMOXICILLIN AND CLAVULANATE POTASSIUM 1 TABLET: 875; 125 TABLET, FILM COATED ORAL at 22:20

## 2024-09-29 RX ADMIN — KETOROLAC TROMETHAMINE 15 MG: 15 INJECTION, SOLUTION INTRAMUSCULAR; INTRAVENOUS at 22:19

## 2024-09-30 NOTE — DISCHARGE INSTRUCTIONS
Do not take more than 4000 mg of acetaminophen or Tylenol in 24-hour period.  No drinking or driving on morphine.  Return to the emergency department if you have increasing facial swelling, difficulty breathing, difficulty swallowing or an abscess.

## 2024-09-30 NOTE — ED NOTES
Patient was read discharge instructions. Patient has no other questions or concerns at this time. Patient is AAOX4. Breathing is even and unlabored Norris Rochaian was discharged home ambulatory, gait upright, steady. Patient tolerated Iv removal

## 2024-09-30 NOTE — ED PROVIDER NOTES
"ER Provider Note    Scribed for Dr. Sang Durán M.D. by Randall Bowen. 9/29/2024  8:45 PM    Primary Care Provider: Elroy Tristan P.A.-C.    CHIEF COMPLAINT   Chief Complaint   Patient presents with    Dental Pain     L lower teeth. Reports she has been taking tylenol/ibuprofen for same and is concerned she took too much. Reports she has been taking \"4 tylenol and 6 ibuprofen every 4-6 hours and now my stomach feels like it's on fire right by my belly button\".     Abdominal Pain     EXTERNAL RECORDS REVIEWED  Other None pertinent to visit    HPI/ROS  LIMITATION TO HISTORY   Select: : None  OUTSIDE HISTORIAN(S):  None    Norris Jefferson is a 24 y.o. female who presents to the ED complaining of dental pain onset one week ago. The patient reports that due to the pain, she was taking 4 regular strength Tylenol and 6 ibuprofen about every 6 hours. She notes a burning in her stomach and states that the medications did not help to alleviate her dental pain. Patient adds that she has taken these medications nearly every day for most of her life due to chronic headaches. Denies chance of pregnancy. No known drug allergies.    PAST MEDICAL HISTORY  Past Medical History:   Diagnosis Date    Migraines      SURGICAL HISTORY  Past Surgical History:   Procedure Laterality Date    PRIMARY C SECTION      TONSILLECTOMY      TUBAL COAGULATION LAPAROSCOPIC BILATERAL       FAMILY HISTORY  Family History   Problem Relation Age of Onset    Cancer Paternal Grandfather      SOCIAL HISTORY   reports that she has never smoked. She has never used smokeless tobacco. She reports current alcohol use. She reports that she does not use drugs.    CURRENT MEDICATIONS  Previous Medications    No medications on file     ALLERGIES  Nkda [no known drug allergy]    PHYSICAL EXAM  BP (!) 159/85   Pulse 87   Temp 36.1 °C (96.9 °F)   Resp 18   Ht 1.702 m (5' 7\")   Wt 124 kg (274 lb 0.5 oz)   LMP 09/19/2024 (Exact Date) Comment: " reports LMP only 2d long. States this is not irregular for her.  SpO2 92%   BMI 42.92 kg/m²   Constitutional: Well developed, Well nourished, Mild distress.   HENT: Normocephalic, Atraumatic, Left lower 1st and 2nd molars have dental caries to the gum. No abscess or signs of Marshal's angina.  Eyes: Conjunctiva normal, No discharge.   Cardiovascular: Normal heart rate, Normal rhythm, No murmurs, equal pulses.   Pulmonary: Normal breath sounds, No respiratory distress, No wheezing, No rales, No rhonchi.  Chest: No chest wall tenderness or deformity.   Abdomen:Soft, No masses, no rebound, no guarding. Tenderness in epigastric region  Musculoskeletal: No major deformities noted, No tenderness.   Skin: Warm, Dry, No erythema, No rash.   Neurologic: Alert & oriented x 3, Normal motor function,  No focal deficits noted.   Psychiatric: Affect normal, Judgment normal, Mood normal.     DIAGNOSTIC STUDIES    LABS  Results for orders placed or performed during the hospital encounter of 09/29/24   CBC WITH DIFFERENTIAL   Result Value Ref Range    WBC 7.0 4.8 - 10.8 K/uL    RBC 4.93 4.20 - 5.40 M/uL    Hemoglobin 13.1 12.0 - 16.0 g/dL    Hematocrit 39.9 37.0 - 47.0 %    MCV 80.9 (L) 81.4 - 97.8 fL    MCH 26.6 (L) 27.0 - 33.0 pg    MCHC 32.8 32.2 - 35.5 g/dL    RDW 42.5 35.9 - 50.0 fL    Platelet Count 260 164 - 446 K/uL    MPV 10.0 9.0 - 12.9 fL    Neutrophils-Polys 49.70 44.00 - 72.00 %    Lymphocytes 38.60 22.00 - 41.00 %    Monocytes 7.30 0.00 - 13.40 %    Eosinophils 3.30 0.00 - 6.90 %    Basophils 1.00 0.00 - 1.80 %    Immature Granulocytes 0.10 0.00 - 0.90 %    Nucleated RBC 0.00 0.00 - 0.20 /100 WBC    Neutrophils (Absolute) 3.47 1.82 - 7.42 K/uL    Lymphs (Absolute) 2.70 1.00 - 4.80 K/uL    Monos (Absolute) 0.51 0.00 - 0.85 K/uL    Eos (Absolute) 0.23 0.00 - 0.51 K/uL    Baso (Absolute) 0.07 0.00 - 0.12 K/uL    Immature Granulocytes (abs) 0.01 0.00 - 0.11 K/uL    NRBC (Absolute) 0.00 K/uL   COMP METABOLIC PANEL   Result  Value Ref Range    Sodium 138 135 - 145 mmol/L    Potassium 4.1 3.6 - 5.5 mmol/L    Chloride 104 96 - 112 mmol/L    Co2 21 20 - 33 mmol/L    Anion Gap 13.0 7.0 - 16.0    Glucose 109 (H) 65 - 99 mg/dL    Bun 15 8 - 22 mg/dL    Creatinine 0.85 0.50 - 1.40 mg/dL    Calcium 9.3 8.4 - 10.2 mg/dL    Correct Calcium 9.1 8.5 - 10.5 mg/dL    AST(SGOT) 29 12 - 45 U/L    ALT(SGPT) 43 2 - 50 U/L    Alkaline Phosphatase 78 30 - 99 U/L    Total Bilirubin 0.2 0.1 - 1.5 mg/dL    Albumin 4.2 3.2 - 4.9 g/dL    Total Protein 7.1 6.0 - 8.2 g/dL    Globulin 2.9 1.9 - 3.5 g/dL    A-G Ratio 1.4 g/dL   ACETAMINOPHEN   Result Value Ref Range    Acetaminophen -Tylenol 19.0 10.0 - 30.0 ug/mL   Salicylate   Result Value Ref Range    Salicylates, Quant. <1.0 (L) 15.0 - 25.0 mg/dL   HCG QUAL SERUM   Result Value Ref Range    Beta-Hcg Qualitative Serum Negative Negative   ESTIMATED GFR   Result Value Ref Range    GFR (CKD-EPI) 97 >60 mL/min/1.73 m 2   APTT   Result Value Ref Range    APTT 28.4 24.7 - 36.0 sec   PROTHROMBIN TIME (INR)   Result Value Ref Range    PT 12.7 12.0 - 14.6 sec    INR 0.93 0.87 - 1.13     I have independently interpreted these labs.    COURSE & MEDICAL DECISION MAKING     ASSESSMENT, COURSE AND PLAN  Care Narrative:       8:45 PM - Patient was seen and evaluated at bedside. Patient presents to the ED for evaluation of dental pain for which she started taking 4 regular strength tylenol and 6 ibuprofen every 4-6 hours, causing a burning in her stomach.  After my exam, I discussed with the patient the plan of care, which includes obtaining lab work for further evaluation. Patient understands and verbalizes agreement to plan of care. Ordered HCG Qual Serum, Salicylate, CBC w/ diff., CMP, and Acetaminophen to evaluate.   Differential diagnoses include but not limited to: Tylenol overdose, infected dental caries    10:10 PM - Spoke with poison control at [phone] regarding the patient's case who told me that as long as the  patient scored below a 20 on their scale, then there would be no cause for concern. She scored a 19. We discussed checking INR and PTT, but if those labs looked normal then she would be stable for discharge. Patient will be treated with Augmentin 875-125 mg oral, Toradol 15 mg injection, Prilosec 20 mg oral, and GI cocktail 30 mL oral suspension.    11:11 PM - I reevaluated the patient at bedside. The patient informs me they feel improved following medication administration. I discussed the patient's diagnostic study results which show no acute abnormalities. I discussed plan for discharge and follow up as outlined below. The patient is stable for discharge at this time and will return for any new or worsening symptoms. Patient verbalizes understanding and support with my plan for discharge.     PROBLEM LIST  Problem #1 Tylenol ingestion.  Patient been taking over the recommended amount of Tylenol for the last week.  Likely she has a Tylenol level less than 20.  After discussion with poison control they state the patient does not need NAC.    Infected dental caries patient has several dental caries down to the gum.  No signs of an abscess or Lisa's angina.At this point in time we will start the patient on Augmentin for them.    Problem #3 epigastric abdominal pain I suspect because the patient been taking large amount of ibuprofen she has a little gastritis or an ulcer.  She had relief with GI cocktail.  Will start her on Prilosec.  Discussed proper dosing of NSAIDs and Tylenol.    DISPOSITION AND DISCUSSIONS  I have discussed management of the patient with the following physicians and LEXIS's:  None    Discussion of management with other QHP or appropriate source(s):  Poison control ([phone])      Escalation of care considered, and ultimately not performed: acute inpatient care management, however at this time, the patient is most appropriate for outpatient management.    Barriers to care at this time, including  but not limited to:  No known barriers of care .     Decision tools and prescription drugs considered including, but not limited to: Antibiotics Oral antibiotics were administered here in the ED .    I reviewed prescription monitoring program for patient's narcotic use before prescribing a scheduled drug.The patient will not drink alcohol nor drive with prescribed medications. The patient will return for new or worsening symptoms and is stable at the time of discharge.    The patient is referred to a primary physician for blood pressure management, diabetic screening, and for all other preventative health concerns.    In prescribing controlled substances to this patient, I certify that I have obtained and reviewed the medical history of Norris Jefferson. I have also made a good aniket effort to obtain applicable records from other providers who have treated the patient and records did not demonstrate any increased risk of substance abuse that would prevent me from prescribing controlled substances.     I have conducted a physical exam and documented it. I have reviewed Ms. Jefferson’s prescription history as maintained by the [place] Prescription Monitoring Program.     I have assessed the patient’s risk for abuse, dependency, and addiction using the validated Opioid Risk Tool available at https://www.mdcalc.com/xymxjk-vvnj-mfod-ort-narcotic-abuse.     Given the above, I believe the benefits of controlled substance therapy outweigh the risks. The reasons for prescribing controlled substances include non-narcotic, oral analgesic alternatives are contraindicated. Accordingly, I have discussed the risk and benefits, treatment plan, and alternative therapies with the patient.     DISPOSITION:  Patient will be discharged home in stable condition.    FOLLOW UP:  Your dentist    Schedule an appointment as soon as possible for a visit in 1 week      [address]  [address]  [phone]    If you need a doctor or dentist      OUTPATIENT MEDICATIONS:  New Prescriptions    AMOXICILLIN-CLAVULANATE (AUGMENTIN) 875-125 MG TAB    Take 1 Tablet by mouth 2 times a day for 7 days.    MORPHINE (MS IR) 15 MG TABLET    Take 0.5 Tablets by mouth every 6 hours as needed for Severe Pain for up to 6 days.    OMEPRAZOLE (PRILOSEC) 20 MG DELAYED-RELEASE CAPSULE    Take 1 Capsule by mouth 2 times a day for 30 days.     FINAL DIANGOSIS  1. Infected dental caries    2. Pain, dental    3. Epigastric abdominal pain       Randall ELLINGTON (Mariana), am scribing for, and in the presence of, Sang Durán M.D.    Electronically signed by: Randall Bowen (Mariana), 9/29/2024    Sang ELLINGTON M.D personally performed the services described in this documentation, as scribed by Randall Bowen in my presence, and it is both accurate and complete.     The note accurately reflects work and decisions made by me.  Sang Durán M.D.  9/30/2024  12:26 AM

## 2024-09-30 NOTE — ED TRIAGE NOTES
"Chief Complaint   Patient presents with    Dental Pain     L lower teeth. Reports she has been taking tylenol/ibuprofen for same and is concerned she took too much. Reports she has been taking \"4 tylenol and 6 ibuprofen every 4-6 hours and now my stomach feels like it's on fire right by my belly button\".     Abdominal Pain     Physical Exam  Pulmonary:      Effort: Pulmonary effort is normal.   Skin:     General: Skin is warm and dry.   Neurological:      Mental Status: She is alert.         "

## 2024-10-03 ENCOUNTER — APPOINTMENT (OUTPATIENT)
Dept: OBGYN | Facility: CLINIC | Age: 24
End: 2024-10-03
Payer: MEDICAID

## 2024-10-23 ENCOUNTER — APPOINTMENT (OUTPATIENT)
Dept: OBGYN | Facility: CLINIC | Age: 24
End: 2024-10-23
Payer: MEDICAID

## 2024-11-18 ENCOUNTER — HOSPITAL ENCOUNTER (OUTPATIENT)
Facility: MEDICAL CENTER | Age: 24
End: 2024-11-18
Payer: MEDICAID

## 2024-11-18 ENCOUNTER — APPOINTMENT (OUTPATIENT)
Dept: OBGYN | Facility: CLINIC | Age: 24
End: 2024-11-18
Payer: MEDICAID

## 2024-11-18 VITALS
SYSTOLIC BLOOD PRESSURE: 90 MMHG | HEIGHT: 67 IN | WEIGHT: 269.8 LBS | DIASTOLIC BLOOD PRESSURE: 68 MMHG | BODY MASS INDEX: 42.35 KG/M2

## 2024-11-18 DIAGNOSIS — E03.9 HYPOTHYROIDISM, UNSPECIFIED TYPE: ICD-10-CM

## 2024-11-18 DIAGNOSIS — E28.2 PCOS (POLYCYSTIC OVARIAN SYNDROME): ICD-10-CM

## 2024-11-18 DIAGNOSIS — Z01.419 WELL WOMAN EXAM WITH ROUTINE GYNECOLOGICAL EXAM: Primary | ICD-10-CM

## 2024-11-18 PROCEDURE — G0101 CA SCREEN;PELVIC/BREAST EXAM: HCPCS

## 2024-11-18 PROCEDURE — 87491 CHLMYD TRACH DNA AMP PROBE: CPT

## 2024-11-18 PROCEDURE — 87591 N.GONORRHOEAE DNA AMP PROB: CPT

## 2024-11-18 PROCEDURE — 88142 CYTOPATH C/V THIN LAYER: CPT

## 2024-11-18 ASSESSMENT — FIBROSIS 4 INDEX: FIB4 SCORE: 0.44

## 2024-11-18 NOTE — PROGRESS NOTES
Norris Jefferson is a 24 y.o. female who presents for her Gynecologic Exam        HPI Comments: Pt presents for well woman exam. Pt has complaints of irregular periods and would like to discuss getting a hysterectomy.  Pt reports a tubal ligation in 2023.  Patient's last menstrual period was 09/08/2024 (exact date)..Pt is sexually active with her male partner. She reports irregular menses that are 3-7 days in length. She reports clots and heavy bleeding. She is using a tubal ligation for birth control. Does not desire conception in the next year.  Patient denies report leaking of urine.  Denies constipation.     Pt currently in nursing school. She is not working outside the home at this time. She has 2 biological children and 2 step children at home, whom she cares for.     Pt reports a history of acne and cysts on her ovaries.  Pt states she doesn't feel well when she eats sugar or high carbohydrate foods. Pt states she's had a hard time losing weight since her last child.  Pt reports a history of hypothyroidism. Not currently taking medication.     Review of Systems   Pertinent positives documented in HPI and all other systems reviewed & are negative    All PMH, PSH, allergies, social history and FH reviewed and updated today:  Past Medical History:   Diagnosis Date    Migraines      Past Surgical History:   Procedure Laterality Date    PRIMARY C SECTION      TONSILLECTOMY      TUBAL COAGULATION LAPAROSCOPIC BILATERAL       Nkda [no known drug allergy]  Social History     Socioeconomic History    Marital status: Single    Highest education level: 12th grade   Occupational History    Occupation: nursing student   Tobacco Use    Smoking status: Never    Smokeless tobacco: Never    Tobacco comments:     vaping   Vaping Use    Vaping status: Former    Substances: Nicotine    Devices: Disposable   Substance and Sexual Activity    Alcohol use: Yes     Comment: occ    Drug use: No    Sexual activity: Yes     Partners:  Male     Birth control/protection: Surgical     Social Drivers of Health     Financial Resource Strain: Low Risk  (6/20/2024)    Overall Financial Resource Strain (CARDIA)     Difficulty of Paying Living Expenses: Not very hard   Food Insecurity: No Food Insecurity (6/20/2024)    Hunger Vital Sign     Worried About Running Out of Food in the Last Year: Never true     Ran Out of Food in the Last Year: Never true   Transportation Needs: No Transportation Needs (6/20/2024)    PRAPARE - Transportation     Lack of Transportation (Medical): No     Lack of Transportation (Non-Medical): No   Physical Activity: Insufficiently Active (6/20/2024)    Exercise Vital Sign     Days of Exercise per Week: 3 days     Minutes of Exercise per Session: 20 min   Stress: Stress Concern Present (6/20/2024)    [employer] - Occupational Stress Questionnaire     Feeling of Stress : Rather much   Social Connections: Moderately Isolated (6/20/2024)    Social Connection and Isolation Panel [NHANES]     Frequency of Communication with Friends and Family: Three times a week     Frequency of Social Gatherings with Friends and Family: Never     Attends Baptism Services: Never     Active Member of Clubs or Organizations: No     Attends Club or Organization Meetings: Patient declined     Marital Status: Living with partner   Intimate Partner Violence: Unknown (3/15/2021)    Received from [facility]    Humiliation, Afraid, Rape, and Kick questionnaire     Fear of Current or Ex-Partner: Not asked     Emotionally Abused: Not asked     Physically Abused: Not asked     Sexually Abused: Not asked   Housing Stability: High Risk (6/20/2024)    Housing Stability Vital Sign     Unable to Pay for Housing in the Last Year: No     Number of Places Lived in the Last Year: 3     Unstable Housing in the Last Year: No     Family History   Problem Relation Age of Onset    Cancer Paternal Grandfather      Medications:   No  "current [facility]-ordered outpatient medications on file.     No current [facility]-ordered facility-administered medications on file.          Objective:   Vital measurements:  BP 90/68   Ht 5' 7\"   Wt 269 lb 12.8 oz   Body mass index is 42.26 kg/m². (Goal BM I>18 <25)    Physical Exam   Nursing note and vitals reviewed.  Constitutional: She is oriented to person, place, and time. She appears well-developed and well-nourished. No distress.     HEENT: Normocephalic and atraumatic. External ears normal. Nose normal. Conjunctivae and EOM are normal. Pupils are equal, round, and reactive to light. No scleral icterus.     Neck: Normal range of motion. Neck supple. No thyromegaly present.     Pulmonary/Chest: Effort normal and breath sounds clear in all quadrants. No respiratory distress.     Cardiovascular: Regular, rate and rhythm. No JVD.    Abdominal: Soft. Bowel sounds are normal. She exhibits no distension and no mass. No tenderness. She has no rebound and no guarding.     Breast: Supple, without skin changes, dimpling. No pain with nipple manipulation.     Genitourinary:  Pelvic exam was performed with patient supine.  External genitalia with no abnormal pigmentation, labial fusion,rash, tenderness, lesion or injury to the labia bilaterally.  Vagina is moist with no lesions, foul discharge, erythema, tenderness or bleeding. No foreign body around the vagina or signs of injury.   Cervix exhibits no motion tenderness, no discharge and no friability.   Uterus is not deviated, enlarged, or tender.  Right adnexum displays no mass, no tenderness and no fullness. Left adnexum displays no mass, no tenderness and no fullness.   Rectal exam deferred    Musculoskeletal: Normal range of motion. She exhibits no edema and no tenderness.     Lymphadenopathy: She has no cervical adenopathy.     Neurological: She is alert and oriented to person, place, and time. She exhibits normal muscle tone.     Skin: Skin is warm and dry. No rash " noted. She is not diaphoretic. No erythema. No pallor.     Psychiatric: She has a normal mood and affect. Her behavior is normal. Judgment and thought content normal.               Assessment:     1. Well woman exam with routine gynecological exam        2. PCOS (polycystic ovarian syndrome)        3. Hypothyroidism, unspecified type  TSH+FREE T4            Plan:   1. Pap and physical exam performed. Discussed intervals of paps at current age per guidelines. Will notify of results  2. Encouraged general breast awareness and self breast exam if appropriate. We discussed recommendation of yearly mammogram at age 40.   3. Counseling: breast self exam  4. Counseled on lifestyle changes such as: increase water and electrolytes, decrease or eliminate sugar, incorporate movement into daily schedule, 8-10 hours of sleep a night.    5.  Pt encouraged to schedule with an MD to discuss hysterectomy  6.  PCOS handout given, discussed lifestyle modification  7.  Thyroid lab slip given  8.   F/U 1 year or prn

## 2024-11-18 NOTE — PROGRESS NOTES
Patient here for GYN exam.  LMP= 9/8/24   BCM: none  Last pap: pt unsure   Last mammogram if applies: n/a   Phone number: [phone] (home)   Pharmacy verified  Pt stated that she has always experienced irregular periods and would like a partial hysterectomy

## 2024-11-19 LAB
C TRACH DNA GENITAL QL NAA+PROBE: NEGATIVE
N GONORRHOEA DNA GENITAL QL NAA+PROBE: NEGATIVE
SPECIMEN SOURCE: NORMAL

## 2024-11-25 LAB — THINPREP PAP, CYTOLOGY NL11781: NORMAL

## 2024-11-26 DIAGNOSIS — Z00.00 PHYSICAL EXAM, ANNUAL: ICD-10-CM

## 2025-01-30 ENCOUNTER — OFFICE VISIT (OUTPATIENT)
Dept: URGENT CARE | Facility: CLINIC | Age: 25
End: 2025-01-30
Payer: COMMERCIAL

## 2025-01-30 VITALS
HEIGHT: 67 IN | HEART RATE: 86 BPM | BODY MASS INDEX: 40.02 KG/M2 | OXYGEN SATURATION: 96 % | TEMPERATURE: 97.9 F | SYSTOLIC BLOOD PRESSURE: 126 MMHG | WEIGHT: 255 LBS | DIASTOLIC BLOOD PRESSURE: 90 MMHG | RESPIRATION RATE: 18 BRPM

## 2025-01-30 DIAGNOSIS — J02.9 PHARYNGITIS, UNSPECIFIED ETIOLOGY: ICD-10-CM

## 2025-01-30 DIAGNOSIS — J06.9 VIRAL URI WITH COUGH: ICD-10-CM

## 2025-01-30 LAB
FLUAV RNA SPEC QL NAA+PROBE: NEGATIVE
FLUBV RNA SPEC QL NAA+PROBE: NEGATIVE
RSV RNA SPEC QL NAA+PROBE: NEGATIVE
S PYO DNA SPEC NAA+PROBE: NOT DETECTED
SARS-COV-2 RNA RESP QL NAA+PROBE: NEGATIVE

## 2025-01-30 ASSESSMENT — FIBROSIS 4 INDEX: FIB4 SCORE: 0.44

## 2025-01-31 NOTE — PROGRESS NOTES
Chief Complaint   Patient presents with    Pharyngitis     X 4 day    Cough          History of Present Illness  The patient is a 24-year-old female who presents for evaluation of a sore throat.    She has been experiencing a persistent sore throat for the past 4 days, which she reports as being similar to episodes of strep throat she experienced during her high school years. She has not had strep throat since her high school years. She also reports the onset of a cough today and intermittent congestion, which she attributes to the cold weather and maintaining a warm indoor environment. She reports no fevers or body aches. She has been experiencing right-sided pain and an 8-day migraine, both of which are common occurrences for her. She has a history of acid reflux but has not experienced any recent episodes. 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 She is uncertain about her influenza vaccination status. She recently recovered from a severe illness characterized by diarrhea and vomiting, which affected her entire household. She does not require a note for work or school. She underwent a tonsillectomy in the past.    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    IMMUNIZATIONS  She is uncertain about her influenza vaccination status.         ROS:    No severe shortness of breath   No Cardiac like chest pain, as discussed   As otherwise stated in HPI    Medical/SX/ Social History:  Reviewed per chart    Pertinent Medications:    No current outpatient medications on file prior to visit.     No current facility-administered medications on file prior to visit.        Allergies:    Nkda [no known drug allergy]     Problem list, medications, and allergies reviewed by myself today in Epic     Physical Exam:    Vitals:    01/30/25 1423   BP: (!) 126/90   Pulse: 86   Resp: 18   Temp: 36.6 °C (97.9 °F)   SpO2: 96%              Physical Exam  Vitals reviewed.   Constitutional:       General: She is not in acute distress.     Appearance: Normal appearance. She is well-developed. She is not toxic-appearing.   HENT:      Head: Normocephalic and atraumatic.      Right Ear: Tympanic membrane, ear canal and external ear normal.      Left Ear: Tympanic membrane, ear canal and external ear normal.      Nose: Congestion and rhinorrhea present.      Mouth/Throat:      Lips: Pink.      Mouth: Mucous membranes are moist.      Pharynx: Posterior oropharyngeal erythema present.   Eyes:      General: Lids are normal. Gaze aligned appropriately. No allergic shiner or scleral icterus.     Extraocular Movements: Extraocular movements intact.      Conjunctiva/sclera: Conjunctivae normal.      Pupils: Pupils are equal, round, and reactive to light.   Cardiovascular:      Rate and Rhythm: Normal rate and regular rhythm.      Pulses:           Radial pulses are 2+ on the right side and 2+ on the left side.      Heart sounds: Normal heart sounds.   Pulmonary:      Effort: Pulmonary effort is normal.      Breath sounds: Normal breath sounds. No wheezing.   Musculoskeletal:      Right lower leg: No edema.      Left lower leg: No edema.   Lymphadenopathy:      Cervical: No cervical adenopathy.   Skin:     General: Skin is warm.      Capillary Refill: Capillary refill takes less than 2 seconds.      Coloration: Skin is not cyanotic or pale.      Findings: No rash.   Neurological:      Mental Status: She is alert.      Gait: Gait is intact.   Psychiatric:         Behavior: Behavior normal. Behavior is cooperative.            Diagnostics:    Results for orders placed or performed in visit on 01/30/25   POCT CEPHEID GROUP A STREP - PCR    Collection Time: 01/30/25  2:32 PM   Result Value Ref Range    POC Group A Strep, PCR Not Detected Not Detected, Invalid   POCT CoV-2, Flu A/B, RSV by PCR    Collection Time: 01/30/25  2:32 PM   Result Value Ref Range     SARS-CoV-2 by PCR Negative Negative, Invalid    Influenza virus A RNA Negative Negative, Invalid    Influenza virus B, PCR Negative Negative, Invalid    RSV, PCR Negative Negative, Invalid       Medical Decision making and plan :  I personally reviewed prior external notes and test results pertinent to today's visit. Pt is clinically stable at today's acute urgent care visit.  Patient appears nontoxic with no acute distress noted. Appropriate for outpatient care at this time.    Pleasant 24 y.o. female presented clinic with:     Assessment & Plan  1. Pharyngitis.  She has been experiencing a sore throat for the past 4 days, with associated symptoms including a newly onset cough and intermittent congestion. There is no fever reported. Physical examination revealed redness and irritation in the throat. She is advised to maintain adequate hydration, perform warm salt water gargles, consume warm tea with honey, and take ibuprofen for symptomatic relief. A strep test and a viral swab will be conducted to rule out streptococcal infection and identify any viral pathogens, including influenza, COVID-19, and RSV. The results will be available within an hour via [facility]. If the strep test is positive, antibiotics will be prescribed. If the viral swab indicates influenza, RSV, or COVID-19, appropriate antiviral treatment will be initiated.  Fortunately all testing is negative    PROCEDURE  The patient underwent a tonsillectomy in the past.      Shared decision-making was utilized with patient for treatment plan. Medication discussed included indication for use and the potential benefits and side effects. Education was provided regarding the aforementioned assessments.  Differential Diagnosis, natural history, and supportive care discussed. All of the patient's questions were answered to their satisfaction at the time of discharge. Patient was encouraged to monitor symptoms closely. Those signs and symptoms which would warrant  concern and mandate seeking a higher level of service through the emergency department discussed at length.  Patient stated agreement and understanding of this plan of care.    Disposition:  Home in stable condition     Voice Recognition Disclaimer:  Portions of this document were created using voice recognition software and [employer] technology provided by [employer].  Patient was informed of [email or web address] technology being used.  The software does have a chance of producing errors of grammar and possibly content. I have made every reasonable attempt to correct obvious errors, but there could be errors of grammar and possibly content that I did not discover before finalizing the documentation.    CARLOTTA Blackmon.

## 2025-02-27 ENCOUNTER — HOSPITAL ENCOUNTER (EMERGENCY)
Facility: MEDICAL CENTER | Age: 25
End: 2025-02-27
Attending: STUDENT IN AN ORGANIZED HEALTH CARE EDUCATION/TRAINING PROGRAM
Payer: COMMERCIAL

## 2025-02-27 VITALS
WEIGHT: 255.29 LBS | BODY MASS INDEX: 40.07 KG/M2 | HEIGHT: 67 IN | HEART RATE: 94 BPM | DIASTOLIC BLOOD PRESSURE: 62 MMHG | TEMPERATURE: 98.4 F | RESPIRATION RATE: 18 BRPM | SYSTOLIC BLOOD PRESSURE: 130 MMHG | OXYGEN SATURATION: 96 %

## 2025-02-27 DIAGNOSIS — R58 ECCHYMOSIS: ICD-10-CM

## 2025-02-27 PROCEDURE — 99282 EMERGENCY DEPT VISIT SF MDM: CPT

## 2025-02-27 ASSESSMENT — FIBROSIS 4 INDEX: FIB4 SCORE: 0.44

## 2025-02-28 NOTE — ED PROVIDER NOTES
"ED Provider Note    CHIEF COMPLAINT  Chief Complaint   Patient presents with    Foot Pain     Px feels she has a blood clot in her L foot. Stated she felt a \"rock hard\" vein on the side of her big toe last night that ruptured and has turned into a bruise. Was c/o LLE pain last night and intermittent pain today, wanted to come get checked out.        EXTERNAL RECORDS REVIEWED  Outpatient Notes feeling medicine visit from 1/30/2025 patient seen for sore throat    HPI/ROS  LIMITATION TO HISTORY     OUTSIDE HISTORIAN(S):      Norris Jefferson is a 24 y.o. female who presents with foot pain.  Patient says that last night she noticed swelling and discoloration along the base of her left first toe.  Patient states that she was walking on the treadmill yesterday but otherwise denies any new physical activity or trauma.  Patient denies fever, chills, chest pain, shortness of breath, cough.  Patient says that she has some mild achiness in both of her calves otherwise no pain and has been ambulating normally.  Patient says that she spoke with her uncle who is finishing medical school and advised her to come to the emergency department and concern for DVT.  Patient denies history of frequent or easy bruising, nosebleeds, heavy vaginal bleeding or bloody stools.    PAST MEDICAL HISTORY   has a past medical history of Migraines.    SURGICAL HISTORY   has a past surgical history that includes primary c section; tonsillectomy; and tubal coagulation laparoscopic bilateral.    FAMILY HISTORY  Family History   Problem Relation Age of Onset    Cancer Paternal Grandfather        SOCIAL HISTORY  Social History     Tobacco Use    Smoking status: Never    Smokeless tobacco: Never    Tobacco comments:     vaping   Vaping Use    Vaping status: Former    Substances: Nicotine    Devices: Disposable   Substance and Sexual Activity    Alcohol use: Yes     Comment: occ    Drug use: No    Sexual activity: Yes     Partners: Male     Birth " "control/protection: Surgical       CURRENT MEDICATIONS  Home Medications       Reviewed by Trina Mejia R.N. (Registered Nurse) on 02/27/25 at 1959  Med List Status: Partial     Medication Last Dose Status        Patient Ian Taking any Medications                           ALLERGIES  Allergies   Allergen Reactions    Nkda [No Known Drug Allergy]        PHYSICAL EXAM  VITAL SIGNS: /62   Pulse 94   Temp 36.9 °C (98.4 °F) (Temporal)   Resp 18   Ht 1.702 m (5' 7\")   Wt 116 kg (255 lb 4.7 oz)   SpO2 96%   BMI 39.98 kg/m²    Constitutional: Alert in no apparent distress.  HENT: No signs of trauma, Bilateral external ears normal, Nose normal.   Eyes: Pupils are equal and reactive, Conjunctiva normal, Non-icteric.   Neck: Normal range of motion, No tenderness, Supple, No stridor.   Cardiovascular: Regular rate and rhythm, no murmurs.   Thorax & Lungs: Normal breath sounds, No respiratory distress, No wheezing, No chest tenderness.   Abdomen: Bowel sounds normal, Soft, No tenderness, No masses, No pulsatile masses.   Skin: Warm, Dry, No erythema, 2 cm area of ecchymosis along the left medial foot at the base of the first toe  Back: No bony tenderness, No CVA tenderness.   Extremities: Intact distal pulses, No edema, No tenderness including in the left calf or thigh, No cyanosis  Musculoskeletal: Good range of motion in all major joints including all 5 toes of the left foot, no bony tenderness along the left foot, ankle, knee or hip.  Normal capillary refill through all 5 toes of the left foot no tenderness to palpation or major deformities noted.   Neurologic: Alert , Normal motor function, Normal sensory function, No focal deficits noted.             COURSE & MEDICAL DECISION MAKING    ASSESSMENT, COURSE AND PLAN  Care Narrative: Patient has a small area of ecchymosis along the medial aspect of her left foot.  She has no bony tenderness or deformity concerning for fracture or dislocation.  Patient has no " signs of cellulitis, necrotizing soft tissue infection, arterial occlusion.  Patient has no clinical signs of DVT.  Patient has no signs of septic arthritis or inflammatory arthritis.   There is no history or exam to suggest underlying coagulopathy.  Discussed with patient supportive care at home return precautions and outpatient follow-up plan.            ADDITIONAL PROBLEMS MANAGED      DISPOSITION AND DISCUSSIONS  Escalation of care considered, and ultimately not performed:Laboratory analysis and diagnostic imaging  Considered plain films and blood work but patient had reassuring exam and no history to suggest underlying hematologic disorder or infectious process    FINAL DIAGNOSIS  1. Ecchymosis         Electronically signed by: Osbaldo Howell D.O., 2/27/2025 8:27 PM

## 2025-02-28 NOTE — ED TRIAGE NOTES
"Chief Complaint   Patient presents with    Foot Pain     Px feels she has a blood clot in her L foot. Stated she felt a \"rock hard\" vein on the side of her big toe last night that ruptured and has turned into a bruise. Was c/o LLE pain last night and intermittent pain today, wanted to come get checked out.        25 yo female to triage for above complaint. Ambulatory.     Pt is alert and oriented, speaking in full sentences, follows commands and responds appropriately to questions.     Patient placed back in lobby and educated on triage process. Asked to inform RN of any changes.    /88   Pulse (!) 101   Temp 37.3 °C (99.1 °F) (Temporal)   Resp 14   Ht 1.702 m (5' 7\")   Wt 116 kg (255 lb 4.7 oz)   SpO2 100%   BMI 39.98 kg/m²     "

## 2025-02-28 NOTE — DISCHARGE INSTRUCTIONS
You should follow-up with your primary care provider.  If you develop chest pain, difficulty breathing, fever, uncontrolled pain please return to the emergency room.